# Patient Record
Sex: FEMALE | Race: WHITE
[De-identification: names, ages, dates, MRNs, and addresses within clinical notes are randomized per-mention and may not be internally consistent; named-entity substitution may affect disease eponyms.]

---

## 2017-06-28 ENCOUNTER — HOSPITAL ENCOUNTER (OUTPATIENT)
Dept: HOSPITAL 47 - RADUSWWP | Age: 22
Discharge: HOME | End: 2017-06-28
Payer: COMMERCIAL

## 2017-06-28 DIAGNOSIS — K76.0: Primary | ICD-10-CM

## 2017-06-28 DIAGNOSIS — K80.20: ICD-10-CM

## 2017-06-28 PROCEDURE — 76700 US EXAM ABDOM COMPLETE: CPT

## 2017-06-28 NOTE — US
EXAMINATION TYPE: US abdomen complete

 

DATE OF EXAM: 6/28/2017

 

COMPARISON: NONE

 

CLINICAL HISTORY: R10.816 Abd Pain,Epigastric R10.84. RUQ pain, N&V, post partum 4 months; patient st
ated is NPO

 

EXAM MEASUREMENTS:

 

Liver Length:  16.7 cm   

Gallbladder Wall:  contracted   

CBD:  0.4 cm

Spleen:  11.4 cm   

Right Kidney:  11.8 x 5.2 x 4.0 cm 

Left Kidney:  11.0 x 5.4 x 4.2 cm   

 

 

 

Pancreas:  wnl

Liver:  fatty liver and mildly heterogeneous  

Gallbladder:  couple of stones in fundus with larger shadowing stone = 1.0 x 0.6 x 0.4cm in LLD and s
upine positions; contracted gallbladder wall in fasting state

**Evidence for sonographic Jaimes's sign:  No

CBD:  wnl 

Spleen:  wnl   

Right Kidney:  wnl   

Left Kidney:  wnl   

Upper IVC:  wnl  

Abd Aorta:  wnl

 

 

 

 

IMPRESSION:

 1 mild fatty hepatic infiltration.

2. Gallstones.

## 2017-07-20 ENCOUNTER — HOSPITAL ENCOUNTER (OUTPATIENT)
Dept: HOSPITAL 47 - OR | Age: 22
Discharge: HOME | End: 2017-07-20
Attending: SURGERY
Payer: COMMERCIAL

## 2017-07-20 VITALS — HEART RATE: 51 BPM | SYSTOLIC BLOOD PRESSURE: 146 MMHG | DIASTOLIC BLOOD PRESSURE: 66 MMHG

## 2017-07-20 VITALS — BODY MASS INDEX: 25.8 KG/M2

## 2017-07-20 VITALS — TEMPERATURE: 97.9 F

## 2017-07-20 VITALS — RESPIRATION RATE: 18 BRPM

## 2017-07-20 DIAGNOSIS — K21.9: ICD-10-CM

## 2017-07-20 DIAGNOSIS — K80.12: Primary | ICD-10-CM

## 2017-07-20 DIAGNOSIS — Z79.891: ICD-10-CM

## 2017-07-20 DIAGNOSIS — Z79.899: ICD-10-CM

## 2017-07-20 DIAGNOSIS — F17.200: ICD-10-CM

## 2017-07-20 PROCEDURE — 81025 URINE PREGNANCY TEST: CPT

## 2017-07-20 PROCEDURE — 88304 TISSUE EXAM BY PATHOLOGIST: CPT

## 2017-07-20 PROCEDURE — 47562 LAPAROSCOPIC CHOLECYSTECTOMY: CPT

## 2017-07-20 NOTE — P.OP
Date of Procedure: 07/20/17


Preoperative Diagnosis: 


Symptomatic cholelithiasis


Postoperative Diagnosis: 


Acute cholecystitis


Procedure(s) Performed: 


laparoScopic cholecystectomy


Implants: 





Anesthesia: TIFFANY


Surgeon: Lonnie Perez


Estimated Blood Loss (ml): 25


Pathology: other


Condition: stable


Disposition: PACU


Indications for Procedure: 





Operative Findings: 


Inflamed gallbladder


Heart tones large impacted with stones


Twisted cystic duct that was twisted upon itself by around 270.


Description of Procedure: 


The patient is a 21-year-old female who presented with epigastric and upper 

abdominal pain which localized in the right upper quadrant was tender with an 

ultrasound suggested cholelithiasis.  Clinical diagnosis of chronic 

cholecystitis was made.  The risks benefits and possible complications of the 

procedure were discussed in detail and informed consent was obtained.  Patient 

was identified in the preop operating holding area questions were answered and 

she was taken back to the operating room where she was placed in the supine 

position.  She was given general anesthesia with endotracheal intubation 

followed by the placement of an orogastric tube and an appropriate timeout was 

called the indication procedure ALLERGIES medications from her prophylaxis were 

all discussed.  Abdomen is prepped and draped in the usual sterile surgical 

fashion supraumbilical region was infiltrated with quarter percent with local 

anesthesia and incision was made with 11 blade and Veress needle was introduced 

and abdomen was insufflated to 15 mmHg.  Once that was done a 10 mm epigastric 

port and two 5 mm right upper quadrant ports were placed.the gallbladder was 

retracted cephalad and superiorly.The fundus was retracted.  There was 

significant amount of inflammation the gallbladder itself.  Cystic duct and 

cystic artery as well as the callus trying a were not clear at all.  Therefore 

the left and right lateral peritoneal attachments to the liver were taken down 

more so on the right lateral side.  In doing so brought the gallbladder up a 

bit longer before dissection in the suspected callus triangle were meticulous 

dissection was done with the help of blunt dissection sharp dissection and 

electrocautery first to identify the cystic cystic artery was clipped 

proximally and distally and transected sharply with the help of electrocautery.

  Further dissection was done so as to dissect the fundus of the gallbladder 

off the gallbladder.  Exposing the hepatic parenchyma.  However there was 

significant amount of inflammatory adhesions posteriorly as well as twisting of 

the cystic duct.  The cystic duct to the infundibulum transition was difficult 

to identify due to the severe inflammatory changes.  Prolonged meticulous 

dissection and meticulous technique to obtain a critical view was done so that 

the infundibulum impacted with stones was untwisted allowing clear 

visualization of the cystic duct going into the common bile duct.  Once that 

was done then the cystic duct was clipped proximally to distally and transected 

sharply with the help of scissors.  Remaining part of the gallbladder was taken 

off the liver with Electrocautery.  Hemostasis was secured with Electrocautery.

  The epigastric 12 mm port was removed skin incision was increased with the 

help of electrocautery as well as scalpel.  This was done because the 

gallbladder wasn't significantly larger in size distended and packed with 

stones.  It was then brought out through the epigastric port site.  Once that 

was done and her PVR was done and all bleeding was controlled hemostasis 

secured abdomen was thoroughly sucked dry.  At this time the procedure was 

completed the patient was turned off and all ports were removed.  The 

epigastric incision site over that had to be increased both in terms of the 

muscles of the skin was closed in layers.  The muscular layer was closed with 

running 0 Vicryl.  Skin was closed with 4-0 Monocryl.  The other port sites 

closed with 4-0 Monocryl.  Approximately 15 mL of local anesthetic was applied 

into the epigastric port site.  This tolerated procedure well there were no 

complications she was extubated and taken to recovery room in stable condition 

after removal of the orogastric tube. 











Plan - Discharge Summary


New Discharge Prescriptions: 


New


   HYDROcodone/APAP 5-325MG [Norco 5-325] 1 tab PO Q4HR PRN #25 tab


     PRN Reason: Pain





No Action


   Ciprofloxacin HCl [Cipro] 500 mg PO Q12HR


   Sertraline [Zoloft] 50 mg PO DAILY


   HYDROcodone/APAP 5-325MG [Norco 5-325] 1 tab PO Q6HR PRN


     PRN Reason: Pain


   Pnv,Calcium 72/Iron/Folic Acid [Prenatal Plus Tablet] 1 each PO DAILY


   Omeprazole 20 mg PO DAILY


Discharge Medication List





Ciprofloxacin HCl [Cipro] 500 mg PO Q12HR 07/14/17 [History]


HYDROcodone/APAP 5-325MG [Norco 5-325] 1 tab PO Q6HR PRN 07/14/17 [History]


Omeprazole 20 mg PO DAILY 07/14/17 [History]


Pnv,Calcium 72/Iron/Folic Acid [Prenatal Plus Tablet] 1 each PO DAILY 07/14/17 [

History]


Sertraline [Zoloft] 50 mg PO DAILY 07/14/17 [History]


HYDROcodone/APAP 5-325MG [Norco 5-325] 1 tab PO Q4HR PRN #25 tab 07/20/17 [Rx]








Follow up Appointment(s)/Referral(s): 


Lonnie Perez MD [STAFF PHYSICIAN] - 10 Days


Patient Instructions/Handouts:  *[facility] - Laparoscopic Cholecystectomy 

Discharge Instructions, *[facility] - (Anesthesia) Discharge Instructions 

Outpatient Surgery


Activity/Diet/Wound Care/Special Instructions: 


Regular diet


Incentive spiromtery. 10 puff /1 h for 1 week


Shower in 24 hours


Ambulate as tolerated


No driving or heavy machinery till pain free off of the pain meds for 48 hours


No heavy lifting more than 20 lbs for 3 weeks. 


Discharge Disposition: HOME SELF-CARE

## 2018-02-05 ENCOUNTER — HOSPITAL ENCOUNTER (OUTPATIENT)
Dept: HOSPITAL 47 - RADXRMAIN | Age: 23
Discharge: HOME | End: 2018-02-05
Attending: NURSE PRACTITIONER
Payer: COMMERCIAL

## 2018-02-05 ENCOUNTER — HOSPITAL ENCOUNTER (EMERGENCY)
Dept: HOSPITAL 47 - EC | Age: 23
Discharge: HOME | End: 2018-02-05
Payer: COMMERCIAL

## 2018-02-05 VITALS — RESPIRATION RATE: 20 BRPM

## 2018-02-05 VITALS — TEMPERATURE: 98.1 F | HEART RATE: 70 BPM | DIASTOLIC BLOOD PRESSURE: 67 MMHG | SYSTOLIC BLOOD PRESSURE: 130 MMHG

## 2018-02-05 DIAGNOSIS — Z79.899: ICD-10-CM

## 2018-02-05 DIAGNOSIS — M54.9: ICD-10-CM

## 2018-02-05 DIAGNOSIS — G89.29: Primary | ICD-10-CM

## 2018-02-05 DIAGNOSIS — F17.200: ICD-10-CM

## 2018-02-05 DIAGNOSIS — F32.9: ICD-10-CM

## 2018-02-05 DIAGNOSIS — F41.9: ICD-10-CM

## 2018-02-05 DIAGNOSIS — Q76.0: ICD-10-CM

## 2018-02-05 DIAGNOSIS — M46.87: Primary | ICD-10-CM

## 2018-02-05 PROCEDURE — 81025 URINE PREGNANCY TEST: CPT

## 2018-02-05 PROCEDURE — 72100 X-RAY EXAM L-S SPINE 2/3 VWS: CPT

## 2018-02-05 PROCEDURE — 99284 EMERGENCY DEPT VISIT MOD MDM: CPT

## 2018-02-05 NOTE — XR
EXAM TYPE: LUMBAR SPINE X RAY SERIES

 

COMPARISON: NONE

 

HISTORY: Pain

 

TECHNIQUE: 3 views are submitted.

 

FINDINGS:

Alignment is anatomic.  The pedicles are intact.  The transverse processes are intact.  There is no  
spondylolisthesis.  Surgical clips in the right upper quadrant are noted. Spina bifida occulta lumbos
acral junction. Mild facet arthropathy L5-S1.

 

IMPRESSION:

1. Mild facet arthropathy L5-S1 with spina bifida occulta at the lumbosacral junction. Correlate with
 MRI as clinically warranted..

## 2018-02-05 NOTE — ED
General Adult HPI





- General


Chief complaint: Back Pain/Injury


Stated complaint: Back pain


Time Seen by Provider: 18 19:44


Source: patient, family, RN notes reviewed


Mode of arrival: ambulatory


Limitations: no limitations





- History of Present Illness


Initial comments: 





22-year-old female presents to the emergency department with a chief complaint 

of flareup of her back pain.  Suffering from back pain for the last years.  She 

was told that she may have broken her back but she never been to this.  She 

states she continues to have back pain worse when she picked up her daughter.  

She denies any numbness or tingling down the legs.  She denies any loss by 

bladder function.  She denies any saddle anesthesia.  She states she did have 

outpatient x-rays today.  She states she's wanted that she can get to help with 

the pain into she should be following up with.  She was unable to get MRI of 

the back.  She states that having any other time.  Denies any fever chills with 

this. Patient denies any recent fever, chills, shortness of breath, chest pain, 

abdominal pain, nausea vomiting, numbness or tingling, dysuria or hematuria, 

constipation or diarrhea, headaches or visual changes, or any other current 

symptoms.





- Related Data


 Home Medications











 Medication  Instructions  Recorded  Confirmed


 


Escitalopram [Lexapro] 10 mg PO DAILY 18


 


hydrOXYzine PAMOATE [Vistaril] 50 mg PO DAILY PRN 18








 Previous Rx's











 Medication  Instructions  Recorded


 


Ibuprofen [Motrin] 600 mg PO Q6HR PRN #20 tab 18











 Allergies











Allergy/AdvReac Type Severity Reaction Status Date / Time


 


No Known Allergies Allergy   Verified 18 20:06














Review of Systems


ROS Statement: 


Those systems with pertinent positive or pertinent negative responses have been 

documented in the HPI.





ROS Other: All systems not noted in ROS Statement are negative.





Past Medical History


Past Medical History: GERD/Reflux


Additional Past Medical History / Comment(s): CURRENTLY ON ANTIBIOTICS FOR 

RECENT UTI.  GALLBALDDER DISORDER


History of Any Multi-Drug Resistant Organisms: None Reported


Past Surgical History:  Section


Past Anesthesia/Blood Transfusion Reactions: No Reported Reaction


Past Psychological History: Anxiety, Depression


Smoking Status: Current every day smoker


Past Alcohol Use History: None Reported


Past Drug Use History: None Reported





- Past Family History


  ** Mother


History Unknown: Yes


Additional Family Medical History / Comment(s): PT ADOPTED BIOLOG[employer] FAMILY 

HHX UNKNOWN





General Exam


Limitations: no limitations


General appearance: alert, in no apparent distress


Respiratory exam: Present: normal lung sounds bilaterally.  Absent: respiratory 

distress, wheezes, rales, rhonchi, stridor


Cardiovascular Exam: Present: regular rate, normal rhythm, normal heart sounds.

  Absent: systolic murmur, diastolic murmur, rubs, gallop, clicks


Extremities exam: Present: normal inspection, full ROM, normal capillary 

refill.  Absent: tenderness, pedal edema, joint swelling, calf tenderness


Back exam: Present: normal inspection, full ROM.  Absent: tenderness, CVA 

tenderness (R), CVA tenderness (L), paraspinal tenderness, vertebral tenderness

, rash noted


Neurological exam: Present: alert, oriented X3


Psychiatric exam: Present: normal affect, normal mood


Skin exam: Present: warm, dry, intact, normal color.  Absent: rash





Course





 Vital Signs











  18





  19:12


 


Temperature 98.3 F


 


Pulse Rate 67


 


Respiratory 20





Rate 


 


Blood Pressure 140/91


 


O2 Sat by Pulse 99





Oximetry 














Medical Decision Making





- Medical Decision Making





22-year-old female presents emergency 5 chief complaint of back pain.  This 

time patient x-rays are reviewed.  This time we discussion follow-up with back 

specialist.  We did give her Dr. Chambers's information back specialist in [facility] 

as well as on-call.  Dr. Zaldivar.  We did discuss Motrin Tylenol for pain.  We 

did discuss return parameters and follow-up and all questions.  Patient family 

stated the Luis management this plan.  All questions have been answered.  

They will be discharged.





- Radiology Data


Radiology results: report reviewed, image reviewed





Disposition


Clinical Impression: 


 Chronic back pain





Disposition: HOME SELF-CARE


Condition: Stable


Instructions:  Chronic Back Pain (ED)


Additional Instructions: 


Please use medication as discussed. Please follow up with family doctor if 

symptoms have not improved over the next two days. Please return to the 

emergency room if your symptoms increase or worsen or for any other concerns. 


Prescriptions: 


Ibuprofen [Motrin] 600 mg PO Q6HR PRN #20 tab


 PRN Reason: Pain


Referrals: 


Aneta Apodaca MD [Primary Care Provider] - 1-2 days


Time of Disposition: 20:13

## 2018-02-21 ENCOUNTER — HOSPITAL ENCOUNTER (OUTPATIENT)
Dept: HOSPITAL 47 - RADMRIMAIN | Age: 23
End: 2018-02-21
Attending: NURSE PRACTITIONER
Payer: COMMERCIAL

## 2018-02-21 DIAGNOSIS — Q76.0: Primary | ICD-10-CM

## 2018-02-21 PROCEDURE — 72158 MRI LUMBAR SPINE W/O & W/DYE: CPT
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
## 2025-07-09 ENCOUNTER — HOSPITAL ENCOUNTER (INPATIENT)
Dept: HOSPITAL 47 - EC | Age: 30
LOS: 5 days | Discharge: HOME | DRG: 222 | End: 2025-07-14
Attending: SURGERY | Admitting: SURGERY
Payer: COMMERCIAL

## 2025-07-09 DIAGNOSIS — F32.A: ICD-10-CM

## 2025-07-09 DIAGNOSIS — Z79.899: ICD-10-CM

## 2025-07-09 DIAGNOSIS — F41.9: ICD-10-CM

## 2025-07-09 DIAGNOSIS — G89.29: ICD-10-CM

## 2025-07-09 DIAGNOSIS — I49.8: ICD-10-CM

## 2025-07-09 DIAGNOSIS — K29.70: ICD-10-CM

## 2025-07-09 DIAGNOSIS — K25.5: Primary | ICD-10-CM

## 2025-07-09 DIAGNOSIS — K21.9: ICD-10-CM

## 2025-07-09 DIAGNOSIS — M54.9: ICD-10-CM

## 2025-07-09 DIAGNOSIS — K65.9: ICD-10-CM

## 2025-07-09 DIAGNOSIS — I45.89: ICD-10-CM

## 2025-07-09 DIAGNOSIS — F17.290: ICD-10-CM

## 2025-07-09 DIAGNOSIS — I44.7: ICD-10-CM

## 2025-07-09 DIAGNOSIS — Z79.1: ICD-10-CM

## 2025-07-09 LAB
ACANTHOCYTES BLD QL SMEAR: (no result)
AGRAN PLATELETS BLD QL SMEAR: (no result)
ALBUMIN SERPL-MCNC: 3.9 G/DL (ref 3.5–5)
ALP SERPL-CCNC: 73 U/L (ref 38–126)
ALT SERPL-CCNC: 79 U/L (ref 4–34)
ANION GAP SERPL CALC-SCNC: 13 MMOL/L
ANISOCYTOSIS BLD QL SMEAR: (no result)
ANISOCYTOSIS BLD QL SMEAR: PRESENT
ANISOCYTOSIS BLD QL: (no result)
APTT BLD: 27.2 SEC (ref 22–30)
AST SERPL-CCNC: 53 U/L (ref 14–36)
AUER BODIES BLD QL SMEAR: (no result)
BASO STIPL BLD QL SMEAR: (no result)
BASO STIPL BLD QL SMEAR: (no result)
BASOPHILS # BLD AUTO: (no result) 10*3/UL (ref 0–0.1)
BASOPHILS # BLD MANUAL: (no result) K/UL (ref 0–0.2)
BASOPHILS # BLD MANUAL: (no result) K/UL (ref 0–0.2)
BASOPHILS NFR BLD AUTO: (no result) %
BASOPHILS NFR SPEC MANUAL: (no result) %
BASOPHILS NFR SPEC MANUAL: (no result) %
BILIRUB BLD-MCNC: 2.5 MG/DL (ref 0.2–1.3)
BLASTS # BLD MANUAL: (no result) %
BLASTS # BLD MANUAL: (no result) %
BLASTS # BLD MANUAL: (no result) K/UL
BLASTS # BLD MANUAL: (no result) K/UL
BUN SERPL-SCNC: 11 MG/DL (ref 7–17)
BURR CELLS BLD QL SMEAR: (no result)
CALCIUM SPEC-MCNC: 8.9 MG/DL (ref 8.4–10.2)
CELLS COUNTED: (no result)
CELLS COUNTED: 100
CHLORIDE SERPL-SCNC: 103 MMOL/L (ref 98–107)
CO2 SERPL-SCNC: 25 MMOL/L (ref 22–30)
CREATININE: 0.44 MG/DL (ref 0.52–1.04)
DACRYOCYTES BLD QL SMEAR: (no result)
DACRYOCYTES BLD QL SMEAR: (no result)
DOHLE BOD BLD QL SMEAR: (no result)
DOHLE BOD BLD QL SMEAR: (no result)
ELLIPTOCYTES BLD QL SMEAR: (no result)
EOSINOPHIL # BLD AUTO: (no result) 10*3/UL (ref 0.04–0.35)
EOSINOPHIL # BLD MANUAL: (no result) K/UL (ref 0–0.7)
EOSINOPHIL # BLD MANUAL: (no result) K/UL (ref 0–0.7)
EOSINOPHIL NFR BLD AUTO: (no result) %
EOSINOPHIL NFR BLD MANUAL: (no result) %
EOSINOPHIL NFR BLD MANUAL: (no result) %
ERYTHROCYTE [DISTWIDTH] IN BLOOD BY AUTOMATED COUNT: 4.65 10*6/UL (ref 4.1–5.2)
ERYTHROCYTE [DISTWIDTH] IN BLOOD: 12.6 % (ref 11.5–14.5)
GIANT PLATELETS BLD QL SMEAR: (no result)
GLUCOSE SERPL-MCNC: 115 MG/DL (ref 74–99)
HCG SERPL QL: NOT DETECTED
HCT VFR BLD AUTO: 38.6 % (ref 37.2–46.3)
HELMET CELLS BLD QL SMEAR: (no result)
HGB BLD-MCNC: 13.7 G/DL (ref 12–15)
HOWELL-JOLLY BOD BLD QL SMEAR: (no result)
HOWELL-JOLLY BOD BLD QL SMEAR: (no result)
HYPERCHROMASIA: (no result)
HYPOCHROMIA BLD QL SMEAR: (no result)
HYPOCHROMIA BLD QL SMEAR: (no result)
HYPOCHROMIA BLD QL: (no result)
IMM GRANULOCYTES # BLD: 0.03 10*3/UL (ref 0–0.04)
INR PPP: 1.7 (ref ?–1.2)
LACTATE BLDV-SCNC: 1.9 MMOL/L (ref 0.7–2)
LG PLATELETS BLD QL SMEAR: (no result)
LG PLATELETS BLD QL SMEAR: (no result)
LIPASE SERPL-CCNC: 62 U/L (ref 23–300)
LYMPHOCYTES # BLD MANUAL: (no result) K/UL (ref 1–4.8)
LYMPHOCYTES # BLD MANUAL: 0.84 K/UL (ref 1–4.8)
LYMPHOCYTES # SPEC AUTO: (no result) 10*3/UL (ref 0.9–5)
LYMPHOCYTES NFR BLD MANUAL: (no result) %
LYMPHOCYTES NFR BLD MANUAL: 6 %
LYMPHOCYTES NFR SPEC AUTO: (no result) %
Lab: (no result)
MACROCYTES BLD QL SMEAR: (no result)
MACROCYTES BLD QL SMEAR: (no result)
MACROCYTES BLD QL: (no result)
MAGNESIUM SPEC-SCNC: 1.3 MG/DL (ref 1.6–2.3)
MCH RBC QN AUTO: 29.5 PG (ref 27–32)
MCHC RBC AUTO-ENTMCNC: 35.5 G/DL (ref 32–37)
MCV RBC AUTO: 83 FL (ref 80–97)
METAMYELOCYTES # BLD: (no result) K/UL
METAMYELOCYTES # BLD: (no result) K/UL
METAMYELOCYTES NFR BLD MANUAL: (no result) %
METAMYELOCYTES NFR BLD MANUAL: (no result) %
MICROCYTES BLD QL SMEAR: (no result)
MICROCYTES BLD QL SMEAR: (no result)
MICROCYTOSIS: (no result)
MONOCYTES # BLD AUTO: (no result) 10*3/UL (ref 0.2–1)
MONOCYTES # BLD MANUAL: (no result) K/UL (ref 0–1)
MONOCYTES # BLD MANUAL: 0.84 K/UL (ref 0–1)
MONOCYTES NFR BLD AUTO: (no result) %
MONOCYTES NFR BLD MANUAL: (no result) %
MONOCYTES NFR BLD MANUAL: 6 %
MYELOCYTES # BLD MANUAL: (no result) K/UL
MYELOCYTES # BLD MANUAL: (no result) K/UL
MYELOCYTES NFR BLD MANUAL: (no result) %
MYELOCYTES NFR BLD MANUAL: (no result) %
NEUTROPHILS # BLD AUTO: (no result) 10*3/UL (ref 1.8–7.7)
NEUTROPHILS NFR BLD AUTO: (no result) %
NEUTROPHILS NFR BLD MANUAL: (no result) %
NEUTROPHILS NFR BLD MANUAL: 80 %
NEUTS BAND # BLD MANUAL: (no result) K/UL
NEUTS BAND # BLD MANUAL: (no result) K/UL
NEUTS BAND NFR BLD: (no result) %
NEUTS BAND NFR BLD: 8 %
NEUTS HYPERSEG # BLD: (no result) 10*3/UL
NEUTS HYPERSEG # BLD: (no result) 10*3/UL
NEUTS SEG # BLD MANUAL: (no result) K/UL (ref 1.3–7.7)
NEUTS SEG # BLD MANUAL: 12.32 K/UL (ref 1.3–7.7)
NRBC # BLD: (no result) /100 WBC (ref 0–0)
NRBC # BLD: 0 /100 WBC (ref 0–0)
NRBC BLD AUTO-RTO: (no result) %
OTHER CELLS NFR BLD MANUAL: (no result) %
OTHER CELLS NFR BLD MANUAL: (no result) %
OVALOCYTES BLD QL SMEAR: (no result)
OVALOCYTES BLD QL SMEAR: (no result)
PAPPENHEIMER BOD BLD QL SMEAR: (no result)
PARASITE [PRESENCE] IN BLOOD BY LIGHT MICROSCOPY: (no result)
PELGER HUET CELLS BLD QL SMEAR: (no result)
PELGER HUET CELLS BLD QL SMEAR: (no result)
PLASMA CELLS # BLD MANUAL: (no result) %
PLASMA CELLS # BLD MANUAL: (no result) %
PLASMA CELLS # BLD MANUAL: (no result) K/UL
PLASMA CELLS # BLD MANUAL: (no result) K/UL
PLATELET # BLD AUTO: 262 10*3/UL (ref 140–440)
PLATELETS.RETICULATED NFR BLD AUTO: (no result) %
PLATELETS.RETICULATED NFR BLD AUTO: (no result) %
PMV BLD AUTO: 9.6 FL (ref 9.5–12.2)
POIKILOCYTOSIS BLD QL SMEAR: (no result)
POIKILOCYTOSIS: (no result)
POLYCHROMASIA BLD QL SMEAR: (no result)
POLYCHROMASIA BLD QL SMEAR: (no result)
POTASSIUM SERPL-SCNC: 3.3 MMOL/L (ref 3.5–5.1)
PROMYELOCYTES # BLD: (no result) K/UL
PROMYELOCYTES # BLD: (no result) K/UL
PROMYELOCYTES NFR BLD MANUAL: (no result) %
PROMYELOCYTES NFR BLD MANUAL: (no result) %
PROT SERPL-MCNC: 6.8 G/DL (ref 6.3–8.2)
PT BLD: 17.4 SEC (ref 10–12.5)
RBC AGGLUTINATION: (no result)
RBC MORPH BLD: (no result)
RBC MORPH BLD: (no result)
ROULEAUX BLD QL SMEAR: (no result)
ROULEAUX BLD QL SMEAR: (no result)
SCHISTOCYTES BLD QL SMEAR: (no result)
SICKLE CELLS BLD QL SMEAR: (no result)
SICKLE CELLS BLD QL SMEAR: (no result)
SMUDGE CELLS BLD QL SMEAR: (no result)
SODIUM SERPL-SCNC: 141 MMOL/L (ref 137–145)
SPHEROCYTES BLD QL SMEAR: (no result)
SPHEROCYTES BLD QL SMEAR: (no result)
STOMATOCYTES BLD QL SMEAR: (no result)
STOMATOCYTES BLD QL SMEAR: (no result)
TARGETS BLD QL SMEAR: (no result)
TARGETS BLD QL SMEAR: (no result)
TOXIC GRANULES BLD QL SMEAR: (no result)
TOXIC GRANULES BLD QL SMEAR: (no result)
VARIANT LYMPHS BLD QL SMEAR: (no result)
WBC # BLD AUTO: 14.01 10*3/UL (ref 4.5–10)
WBC NRBC COR # BLD: (no result) K/UL
WBC NRBC COR # BLD: (no result) K/UL
WBC TOXIC VACUOLES BLD QL SMEAR: (no result)
WBC TOXIC VACUOLES BLD QL SMEAR: (no result)

## 2025-07-09 PROCEDURE — 84703 CHORIONIC GONADOTROPIN ASSAY: CPT

## 2025-07-09 PROCEDURE — 99291 CRITICAL CARE FIRST HOUR: CPT

## 2025-07-09 PROCEDURE — 86900 BLOOD TYPING SEROLOGIC ABO: CPT

## 2025-07-09 PROCEDURE — 0DQ70ZZ REPAIR STOMACH, PYLORUS, OPEN APPROACH: ICD-10-PCS

## 2025-07-09 PROCEDURE — 86901 BLOOD TYPING SEROLOGIC RH(D): CPT

## 2025-07-09 PROCEDURE — 96375 TX/PRO/DX INJ NEW DRUG ADDON: CPT

## 2025-07-09 PROCEDURE — 84100 ASSAY OF PHOSPHORUS: CPT

## 2025-07-09 PROCEDURE — 83735 ASSAY OF MAGNESIUM: CPT

## 2025-07-09 PROCEDURE — 80048 BASIC METABOLIC PNL TOTAL CA: CPT

## 2025-07-09 PROCEDURE — 83690 ASSAY OF LIPASE: CPT

## 2025-07-09 PROCEDURE — 85025 COMPLETE CBC W/AUTO DIFF WBC: CPT

## 2025-07-09 PROCEDURE — 83605 ASSAY OF LACTIC ACID: CPT

## 2025-07-09 PROCEDURE — 85730 THROMBOPLASTIN TIME PARTIAL: CPT

## 2025-07-09 PROCEDURE — 36415 COLL VENOUS BLD VENIPUNCTURE: CPT

## 2025-07-09 PROCEDURE — 85610 PROTHROMBIN TIME: CPT

## 2025-07-09 PROCEDURE — 82140 ASSAY OF AMMONIA: CPT

## 2025-07-09 PROCEDURE — 80053 COMPREHEN METABOLIC PANEL: CPT

## 2025-07-09 PROCEDURE — 96374 THER/PROPH/DIAG INJ IV PUSH: CPT

## 2025-07-09 PROCEDURE — 84484 ASSAY OF TROPONIN QUANT: CPT

## 2025-07-09 PROCEDURE — 86850 RBC ANTIBODY SCREEN: CPT

## 2025-07-09 RX ADMIN — PANTOPRAZOLE SODIUM STA MG: 40 INJECTION, POWDER, FOR SOLUTION INTRAVENOUS at 16:29

## 2025-07-09 RX ADMIN — POTASSIUM CHLORIDE ONE MLS: 14.9 INJECTION, SOLUTION INTRAVENOUS at 16:52

## 2025-07-09 RX ADMIN — HYDROMORPHONE HYDROCHLORIDE STA MG: 1 INJECTION, SOLUTION INTRAMUSCULAR; INTRAVENOUS; SUBCUTANEOUS at 16:27

## 2025-07-09 RX ADMIN — PANTOPRAZOLE SODIUM SCH MG: 40 INJECTION, POWDER, FOR SOLUTION INTRAVENOUS at 20:21

## 2025-07-09 RX ADMIN — CEFAZOLIN STA MLS/HR: 330 INJECTION, POWDER, FOR SOLUTION INTRAMUSCULAR; INTRAVENOUS at 16:19

## 2025-07-09 RX ADMIN — DEXTROSE MONOHYDRATE, SODIUM CHLORIDE, AND POTASSIUM CHLORIDE SCH MLS/HR: 50; 4.5; 1.49 INJECTION, SOLUTION INTRAVENOUS at 21:47

## 2025-07-09 RX ADMIN — DEXAMETHASONE SODIUM PHOSPHATE ONE MG: 4 INJECTION, SOLUTION INTRAMUSCULAR; INTRAVENOUS at 16:59

## 2025-07-09 RX ADMIN — PIPERACILLIN AND TAZOBACTAM SCH MLS/HR: 3; .375 INJECTION, POWDER, FOR SOLUTION INTRAVENOUS at 16:34

## 2025-07-09 RX ADMIN — HEPARIN SODIUM SCH UNIT: 5000 INJECTION, SOLUTION INTRAVENOUS; SUBCUTANEOUS at 23:58

## 2025-07-09 RX ADMIN — POTASSIUM CHLORIDE ONE MLS: 14.9 INJECTION, SOLUTION INTRAVENOUS at 17:53

## 2025-07-09 RX ADMIN — HYDROMORPHONE HYDROCHLORIDE PRN MG: 1 INJECTION, SOLUTION INTRAMUSCULAR; INTRAVENOUS; SUBCUTANEOUS at 19:28

## 2025-07-09 RX ADMIN — HEPARIN SODIUM ONE UNIT: 5000 INJECTION, SOLUTION INTRAVENOUS; SUBCUTANEOUS at 16:59

## 2025-07-09 RX ADMIN — ONDANSETRON STA MG: 2 INJECTION INTRAMUSCULAR; INTRAVENOUS at 16:30

## 2025-07-09 RX ADMIN — POTASSIUM CHLORIDE SCH: 14.9 INJECTION, SOLUTION INTRAVENOUS at 20:48

## 2025-07-10 LAB
ACANTHOCYTES BLD QL SMEAR: (no result)
AGRAN PLATELETS BLD QL SMEAR: (no result)
ALBUMIN SERPL-MCNC: 3 G/DL (ref 3.8–4.9)
ALBUMIN/GLOB SERPL: 1.43 RATIO (ref 1.6–3.17)
ALP SERPL-CCNC: 74 U/L (ref 41–126)
ALT SERPL-CCNC: 57 U/L (ref 8–44)
ANION GAP SERPL CALC-SCNC: 10.5 MMOL/L (ref 4–12)
ANISOCYTOSIS BLD QL SMEAR: (no result)
ANISOCYTOSIS BLD QL: (no result)
AST SERPL-CCNC: 30 U/L (ref 13–35)
AUER BODIES BLD QL SMEAR: (no result)
BASO STIPL BLD QL SMEAR: (no result)
BASOPHILS # BLD AUTO: 0.04 X 10*3/UL (ref 0–0.1)
BASOPHILS # BLD MANUAL: (no result) K/UL (ref 0–0.2)
BASOPHILS NFR BLD AUTO: 0.3 %
BASOPHILS NFR SPEC MANUAL: (no result) %
BILIRUB BLD-MCNC: 0.7 MG/DL (ref 0.3–1.2)
BLASTS # BLD MANUAL: (no result) %
BLASTS # BLD MANUAL: (no result) K/UL
BUN SERPL-SCNC: 14.6 MG/DL (ref 9–27)
BUN/CREAT SERPL: 29.2 RATIO (ref 12–20)
BURR CELLS BLD QL SMEAR: (no result)
CALCIUM SPEC-MCNC: 8.1 MG/DL (ref 8.7–10.3)
CELLS COUNTED: (no result)
CHLORIDE SERPL-SCNC: 107 MMOL/L (ref 96–109)
CO2 SERPL-SCNC: 23.5 MMOL/L (ref 21.6–31.8)
CREATININE: 0.5 MG/DL (ref 0.6–1.5)
DACRYOCYTES BLD QL SMEAR: (no result)
DOHLE BOD BLD QL SMEAR: (no result)
ELLIPTOCYTES BLD QL SMEAR: (no result)
EOSINOPHIL # BLD AUTO: 0.03 X 10*3/UL (ref 0.04–0.35)
EOSINOPHIL # BLD MANUAL: (no result) K/UL (ref 0–0.7)
EOSINOPHIL NFR BLD AUTO: 0.3 %
EOSINOPHIL NFR BLD MANUAL: (no result) %
ERYTHROCYTE [DISTWIDTH] IN BLOOD BY AUTOMATED COUNT: 3.79 X 10*6/UL (ref 4.1–5.2)
ERYTHROCYTE [DISTWIDTH] IN BLOOD: 13.1 % (ref 11.5–14.5)
GIANT PLATELETS BLD QL SMEAR: (no result)
GLOBULIN SER CALC-MCNC: 2.1 G/DL (ref 1.6–3.3)
GLUCOSE SERPL-MCNC: 96 MG/DL (ref 70–110)
HCT VFR BLD AUTO: 32.6 % (ref 37.2–46.3)
HELMET CELLS BLD QL SMEAR: (no result)
HGB BLD-MCNC: 11.3 G/DL (ref 12–15)
HOWELL-JOLLY BOD BLD QL SMEAR: (no result)
HYPERCHROMASIA: (no result)
HYPOCHROMIA BLD QL SMEAR: (no result)
HYPOCHROMIA BLD QL: (no result)
IMM GRANULOCYTES # BLD: 0.05 X 10*3/UL (ref 0–0.04)
IMM GRANULOCYTES BLD QL AUTO: 0.4 %
LG PLATELETS BLD QL SMEAR: (no result)
LYMPHOCYTES # BLD MANUAL: (no result) K/UL (ref 1–4.8)
LYMPHOCYTES # SPEC AUTO: 0.57 X 10*3/UL (ref 0.9–5)
LYMPHOCYTES NFR BLD MANUAL: (no result) %
LYMPHOCYTES NFR SPEC AUTO: 4.8 %
Lab: (no result)
Lab: (no result)
MACROCYTES BLD QL SMEAR: (no result)
MACROCYTES BLD QL: (no result)
MAGNESIUM SPEC-SCNC: 1.5 MG/DL (ref 1.5–2.4)
MCH RBC QN AUTO: 29.8 PG (ref 27–32)
MCHC RBC AUTO-ENTMCNC: 34.7 G/DL (ref 32–37)
MCV RBC AUTO: 86 FL (ref 80–97)
METAMYELOCYTES # BLD: (no result) K/UL
METAMYELOCYTES NFR BLD MANUAL: (no result) %
MICROCYTES BLD QL SMEAR: (no result)
MICROCYTOSIS: (no result)
MONOCYTES # BLD AUTO: 0.86 X 10*3/UL (ref 0.2–1)
MONOCYTES # BLD MANUAL: (no result) K/UL (ref 0–1)
MONOCYTES NFR BLD AUTO: 7.2 %
MONOCYTES NFR BLD MANUAL: (no result) %
MYELOCYTES # BLD MANUAL: (no result) K/UL
MYELOCYTES NFR BLD MANUAL: (no result) %
NEUTROPHILS # BLD AUTO: 10.42 X 10*3/UL (ref 1.8–7.7)
NEUTROPHILS NFR BLD AUTO: 87 %
NEUTROPHILS NFR BLD MANUAL: (no result) %
NEUTS BAND # BLD MANUAL: (no result) K/UL
NEUTS BAND NFR BLD: (no result) %
NEUTS HYPERSEG # BLD: (no result) 10*3/UL
NEUTS SEG # BLD MANUAL: (no result) K/UL (ref 1.3–7.7)
NRBC # BLD: (no result) /100 WBC (ref 0–0)
NRBC BLD AUTO-RTO: 0 X 10*3/UL (ref 0–0.01)
OTHER CELLS NFR BLD MANUAL: (no result) %
OVALOCYTES BLD QL SMEAR: (no result)
PAPPENHEIMER BOD BLD QL SMEAR: (no result)
PARASITE [PRESENCE] IN BLOOD BY LIGHT MICROSCOPY: (no result)
PELGER HUET CELLS BLD QL SMEAR: (no result)
PHOSPHATE SERPL-MCNC: 2.7 MG/DL (ref 2.4–5.1)
PLASMA CELLS # BLD MANUAL: (no result) %
PLASMA CELLS # BLD MANUAL: (no result) K/UL
PLATELET # BLD AUTO: 220 X 10*3/UL (ref 140–440)
PLATELETS.RETICULATED NFR BLD AUTO: (no result) %
PMV BLD AUTO: 11 FL (ref 9.5–12.2)
POIKILOCYTOSIS BLD QL SMEAR: (no result)
POIKILOCYTOSIS BLD QL SMEAR: (no result)
POIKILOCYTOSIS: (no result)
POLYCHROMASIA BLD QL SMEAR: (no result)
POTASSIUM SERPL-SCNC: 3.4 MMOL/L (ref 3.5–5.5)
PROMYELOCYTES # BLD: (no result) K/UL
PROMYELOCYTES NFR BLD MANUAL: (no result) %
PROT SERPL-MCNC: 5.1 G/DL (ref 6.2–8.2)
RBC AGGLUTINATION: (no result)
RBC MORPH BLD: (no result)
ROULEAUX BLD QL SMEAR: (no result)
SCHISTOCYTES BLD QL SMEAR: (no result)
SCHISTOCYTES BLD QL SMEAR: (no result)
SICKLE CELLS BLD QL SMEAR: (no result)
SMUDGE CELLS BLD QL SMEAR: (no result)
SODIUM SERPL-SCNC: 141 MMOL/L (ref 135–145)
SPHEROCYTES BLD QL SMEAR: (no result)
STOMATOCYTES BLD QL SMEAR: (no result)
TARGETS BLD QL SMEAR: (no result)
TOXIC GRANULES BLD QL SMEAR: (no result)
VARIANT LYMPHS BLD QL SMEAR: (no result)
VARIANT LYMPHS BLD QL SMEAR: (no result)
WBC # BLD AUTO: 11.97 X 10*3/UL (ref 4.5–10)
WBC NRBC COR # BLD: (no result) K/UL
WBC TOXIC VACUOLES BLD QL SMEAR: (no result)

## 2025-07-10 RX ADMIN — ONDANSETRON PRN MG: 2 INJECTION INTRAMUSCULAR; INTRAVENOUS at 00:32

## 2025-07-10 RX ADMIN — TOPICAL ANESTHETIC PRN EACH: 200 SPRAY DENTAL; PERIODONTAL at 08:06

## 2025-07-10 RX ADMIN — FLUCONAZOLE IN SODIUM CHLORIDE SCH MLS/HR: 2 INJECTION, SOLUTION INTRAVENOUS at 11:52

## 2025-07-11 LAB
ANION GAP SERPL CALC-SCNC: 8.6 MMOL/L (ref 4–12)
BASOPHILS # BLD AUTO: 0.03 X 10*3/UL (ref 0–0.1)
BASOPHILS NFR BLD AUTO: 0.3 %
BUN SERPL-SCNC: 14.9 MG/DL (ref 9–27)
BUN/CREAT SERPL: 29.8 RATIO (ref 12–20)
BURR CELLS BLD QL SMEAR: (no result)
CALCIUM SPEC-MCNC: 8.1 MG/DL (ref 8.7–10.3)
CHLORIDE SERPL-SCNC: 105 MMOL/L (ref 96–109)
CO2 SERPL-SCNC: 26.4 MMOL/L (ref 21.6–31.8)
CREATININE: 0.5 MG/DL (ref 0.6–1.5)
EOSINOPHIL # BLD AUTO: 0.12 X 10*3/UL (ref 0.04–0.35)
EOSINOPHIL NFR BLD AUTO: 1.4 %
ERYTHROCYTE [DISTWIDTH] IN BLOOD BY AUTOMATED COUNT: 3.61 X 10*6/UL (ref 4.1–5.2)
ERYTHROCYTE [DISTWIDTH] IN BLOOD: 13.1 % (ref 11.5–14.5)
GLUCOSE SERPL-MCNC: 79 MG/DL (ref 70–110)
HCT VFR BLD AUTO: 31.4 % (ref 37.2–46.3)
HGB BLD-MCNC: 10.6 G/DL (ref 12–15)
IMM GRANULOCYTES # BLD: 0.09 X 10*3/UL (ref 0–0.04)
IMM GRANULOCYTES BLD QL AUTO: 1 %
LYMPHOCYTES # SPEC AUTO: 1.5 X 10*3/UL (ref 0.9–5)
LYMPHOCYTES NFR SPEC AUTO: 17.2 %
MCH RBC QN AUTO: 29.4 PG (ref 27–32)
MCHC RBC AUTO-ENTMCNC: 33.8 G/DL (ref 32–37)
MCV RBC AUTO: 87 FL (ref 80–97)
MONOCYTES # BLD AUTO: 0.66 X 10*3/UL (ref 0.2–1)
MONOCYTES NFR BLD AUTO: 7.6 %
NEUTROPHILS # BLD AUTO: 6.33 X 10*3/UL (ref 1.8–7.7)
NEUTROPHILS NFR BLD AUTO: 72.5 %
NRBC BLD AUTO-RTO: 0 X 10*3/UL (ref 0–0.01)
PLATELET # BLD AUTO: 206 X 10*3/UL (ref 140–440)
PMV BLD AUTO: 10.6 FL (ref 9.5–12.2)
POTASSIUM SERPL-SCNC: 3.7 MMOL/L (ref 3.5–5.5)
SODIUM SERPL-SCNC: 140 MMOL/L (ref 135–145)
WBC # BLD AUTO: 8.73 X 10*3/UL (ref 4.5–10)

## 2025-07-11 RX ADMIN — HYDROMORPHONE HYDROCHLORIDE PRN MG: 1 INJECTION, SOLUTION INTRAMUSCULAR; INTRAVENOUS; SUBCUTANEOUS at 22:05

## 2025-07-12 LAB
ACANTHOCYTES BLD QL SMEAR: (no result)
AGRAN PLATELETS BLD QL SMEAR: (no result)
ANISOCYTOSIS BLD QL SMEAR: (no result)
ANISOCYTOSIS BLD QL: (no result)
AUER BODIES BLD QL SMEAR: (no result)
BASO STIPL BLD QL SMEAR: (no result)
BASOPHILS # BLD MANUAL: (no result) K/UL (ref 0–0.2)
BASOPHILS NFR SPEC MANUAL: (no result) %
BLASTS # BLD MANUAL: (no result) %
BLASTS # BLD MANUAL: (no result) K/UL
BURR CELLS BLD QL SMEAR: (no result)
BURR CELLS BLD QL SMEAR: (no result)
CELLS COUNTED: (no result)
DACRYOCYTES BLD QL SMEAR: (no result)
DOHLE BOD BLD QL SMEAR: (no result)
ELLIPTOCYTES BLD QL SMEAR: (no result)
EOSINOPHIL # BLD MANUAL: (no result) K/UL (ref 0–0.7)
EOSINOPHIL NFR BLD MANUAL: (no result) %
GIANT PLATELETS BLD QL SMEAR: (no result)
HELMET CELLS BLD QL SMEAR: (no result)
HOWELL-JOLLY BOD BLD QL SMEAR: (no result)
HYPERCHROMASIA: (no result)
HYPOCHROMIA BLD QL SMEAR: (no result)
HYPOCHROMIA BLD QL: (no result)
LG PLATELETS BLD QL SMEAR: (no result)
LYMPHOCYTES # BLD MANUAL: (no result) K/UL (ref 1–4.8)
LYMPHOCYTES NFR BLD MANUAL: (no result) %
Lab: (no result)
Lab: (no result)
MACROCYTES BLD QL SMEAR: (no result)
MACROCYTES BLD QL: (no result)
METAMYELOCYTES # BLD: (no result) K/UL
METAMYELOCYTES NFR BLD MANUAL: (no result) %
MICROCYTES BLD QL SMEAR: (no result)
MICROCYTOSIS: (no result)
MONOCYTES # BLD MANUAL: (no result) K/UL (ref 0–1)
MONOCYTES NFR BLD MANUAL: (no result) %
MYELOCYTES # BLD MANUAL: (no result) K/UL
MYELOCYTES NFR BLD MANUAL: (no result) %
NEUTROPHILS NFR BLD MANUAL: (no result) %
NEUTS BAND # BLD MANUAL: (no result) K/UL
NEUTS BAND NFR BLD: (no result) %
NEUTS HYPERSEG # BLD: (no result) 10*3/UL
NEUTS SEG # BLD MANUAL: (no result) K/UL (ref 1.3–7.7)
NRBC # BLD: (no result) /100 WBC (ref 0–0)
OTHER CELLS NFR BLD MANUAL: (no result) %
OVALOCYTES BLD QL SMEAR: (no result)
PAPPENHEIMER BOD BLD QL SMEAR: (no result)
PARASITE [PRESENCE] IN BLOOD BY LIGHT MICROSCOPY: (no result)
PELGER HUET CELLS BLD QL SMEAR: (no result)
PLASMA CELLS # BLD MANUAL: (no result) %
PLASMA CELLS # BLD MANUAL: (no result) K/UL
PLATELETS.RETICULATED NFR BLD AUTO: (no result) %
POIKILOCYTOSIS BLD QL SMEAR: (no result)
POIKILOCYTOSIS BLD QL SMEAR: (no result)
POIKILOCYTOSIS: (no result)
POLYCHROMASIA BLD QL SMEAR: (no result)
PROMYELOCYTES # BLD: (no result) K/UL
PROMYELOCYTES NFR BLD MANUAL: (no result) %
RBC AGGLUTINATION: (no result)
RBC MORPH BLD: (no result)
ROULEAUX BLD QL SMEAR: (no result)
SCHISTOCYTES BLD QL SMEAR: (no result)
SCHISTOCYTES BLD QL SMEAR: (no result)
SICKLE CELLS BLD QL SMEAR: (no result)
SMUDGE CELLS BLD QL SMEAR: (no result)
SPHEROCYTES BLD QL SMEAR: (no result)
STOMATOCYTES BLD QL SMEAR: (no result)
TARGETS BLD QL SMEAR: (no result)
TOXIC GRANULES BLD QL SMEAR: (no result)
VARIANT LYMPHS BLD QL SMEAR: (no result)
VARIANT LYMPHS BLD QL SMEAR: (no result)
WBC NRBC COR # BLD: (no result) K/UL
WBC TOXIC VACUOLES BLD QL SMEAR: (no result)

## 2025-07-12 RX ADMIN — METOCLOPRAMIDE PRN MG: 5 INJECTION, SOLUTION INTRAMUSCULAR; INTRAVENOUS at 10:21

## 2025-07-13 VITALS — RESPIRATION RATE: 16 BRPM

## 2025-07-13 LAB
ANION GAP SERPL CALC-SCNC: 8.7 MMOL/L (ref 4–12)
BASOPHILS # BLD AUTO: 0.03 X 10*3/UL (ref 0–0.1)
BASOPHILS NFR BLD AUTO: 0.3 %
BUN SERPL-SCNC: 3.5 MG/DL (ref 9–27)
BUN/CREAT SERPL: 7 RATIO (ref 12–20)
CALCIUM SPEC-MCNC: 7.6 MG/DL (ref 8.7–10.3)
CHLORIDE SERPL-SCNC: 104 MMOL/L (ref 96–109)
CO2 SERPL-SCNC: 24.3 MMOL/L (ref 21.6–31.8)
CREATININE: 0.5 MG/DL (ref 0.6–1.5)
EOSINOPHIL # BLD AUTO: 0.11 X 10*3/UL (ref 0.04–0.35)
EOSINOPHIL NFR BLD AUTO: 1.2 %
ERYTHROCYTE [DISTWIDTH] IN BLOOD BY AUTOMATED COUNT: 3.17 X 10*6/UL (ref 4.1–5.2)
ERYTHROCYTE [DISTWIDTH] IN BLOOD: 13 % (ref 11.5–14.5)
GLUCOSE SERPL-MCNC: 107 MG/DL (ref 70–110)
HCT VFR BLD AUTO: 27.2 % (ref 37.2–46.3)
HGB BLD-MCNC: 9.2 G/DL (ref 12–15)
IMM GRANULOCYTES # BLD: 0.06 X 10*3/UL (ref 0–0.04)
IMM GRANULOCYTES BLD QL AUTO: 0.7 %
LYMPHOCYTES # SPEC AUTO: 1.27 X 10*3/UL (ref 0.9–5)
LYMPHOCYTES NFR SPEC AUTO: 14.3 %
MCH RBC QN AUTO: 29 PG (ref 27–32)
MCHC RBC AUTO-ENTMCNC: 33.8 G/DL (ref 32–37)
MCV RBC AUTO: 85.8 FL (ref 80–97)
MONOCYTES # BLD AUTO: 1.34 X 10*3/UL (ref 0.2–1)
MONOCYTES NFR BLD AUTO: 15.1 %
NEUTROPHILS # BLD AUTO: 6.08 X 10*3/UL (ref 1.8–7.7)
NEUTROPHILS NFR BLD AUTO: 68.4 %
NRBC BLD AUTO-RTO: 0 X 10*3/UL (ref 0–0.01)
PLATELET # BLD AUTO: 208 X 10*3/UL (ref 140–440)
PMV BLD AUTO: 10.8 FL (ref 9.5–12.2)
POTASSIUM SERPL-SCNC: 3.1 MMOL/L (ref 3.5–5.5)
SODIUM SERPL-SCNC: 137 MMOL/L (ref 135–145)
WBC # BLD AUTO: 8.89 X 10*3/UL (ref 4.5–10)

## 2025-07-13 RX ADMIN — POTASSIUM CHLORIDE SCH MEQ: 20 TABLET, EXTENDED RELEASE ORAL at 10:04

## 2025-07-14 VITALS — HEART RATE: 74 BPM | TEMPERATURE: 98.2 F | DIASTOLIC BLOOD PRESSURE: 66 MMHG | SYSTOLIC BLOOD PRESSURE: 106 MMHG
